# Patient Record
Sex: FEMALE | URBAN - METROPOLITAN AREA
[De-identification: names, ages, dates, MRNs, and addresses within clinical notes are randomized per-mention and may not be internally consistent; named-entity substitution may affect disease eponyms.]

---

## 2022-10-25 ENCOUNTER — OFFICE VISIT (OUTPATIENT)
Dept: URBAN - METROPOLITAN AREA CLINIC 5 | Facility: CLINIC | Age: 67
End: 2022-10-25
Payer: COMMERCIAL

## 2022-10-25 DIAGNOSIS — H02.88B MEIBOMIAN GLAND DYSFNCT LEFT EYE, UPPER AND LOWER EYELIDS: ICD-10-CM

## 2022-10-25 DIAGNOSIS — H04.123 DRY EYE SYNDROME OF BILATERAL LACRIMAL GLANDS: ICD-10-CM

## 2022-10-25 DIAGNOSIS — H25.13 AGE-RELATED NUCLEAR CATARACT, BILATERAL: Primary | ICD-10-CM

## 2022-10-25 DIAGNOSIS — H02.88A MEIBOMIAN GLAND DYSFUNCTION OF RIGHT EYE, UPPER AND LOWER EYELIDS: ICD-10-CM

## 2022-10-25 PROCEDURE — 92004 COMPRE OPH EXAM NEW PT 1/>: CPT | Performed by: OPTOMETRIST

## 2022-10-25 ASSESSMENT — INTRAOCULAR PRESSURE
OS: 14
OD: 11

## 2022-10-25 NOTE — IMPRESSION/PLAN
Impression: Meibomian gland dysfnct left eye, upper and lower eyelids: H02.88B.  Plan: see plan above

## 2022-10-25 NOTE — IMPRESSION/PLAN
Impression: Age-related nuclear cataract, bilateral: H25.13. Plan: Cataract: Observe for now without intervention. The patient was advised to contact us if any change or worsening of vision. Recheck in 2-3 months.

## 2022-10-25 NOTE — IMPRESSION/PLAN
Impression: Meibomian gland dysfunction of right eye, upper and lower eyelids: H02.88A. Plan: Meibomian gland dysfunction bilateral upper and lower lids - glands expressed in office. Use hot compresses and perform lid hygiene daily. Discussed with the patient benefits of flaxseed oil or omega 3 supplements. Discussed also using artificial tears.

## 2023-01-24 ENCOUNTER — OFFICE VISIT (OUTPATIENT)
Dept: URBAN - METROPOLITAN AREA CLINIC 6 | Facility: CLINIC | Age: 68
End: 2023-01-24
Payer: COMMERCIAL

## 2023-01-24 DIAGNOSIS — H04.123 DRY EYE SYNDROME OF BILATERAL LACRIMAL GLANDS: ICD-10-CM

## 2023-01-24 DIAGNOSIS — H25.812 COMBINED FORMS OF AGE-RELATED CATARACT, LEFT EYE: ICD-10-CM

## 2023-01-24 DIAGNOSIS — H25.13 AGE-RELATED NUCLEAR CATARACT, BILATERAL: Primary | ICD-10-CM

## 2023-01-24 DIAGNOSIS — H25.813 COMBINED FORMS OF AGE-RELATED CATARACT, BILATERAL: ICD-10-CM

## 2023-01-24 PROCEDURE — 92014 COMPRE OPH EXAM EST PT 1/>: CPT | Performed by: OPTOMETRIST

## 2023-01-24 ASSESSMENT — VISUAL ACUITY
OS: 20/30
OD: 20/30

## 2023-01-24 ASSESSMENT — INTRAOCULAR PRESSURE
OD: 15
OS: 13

## 2023-01-24 NOTE — IMPRESSION/PLAN
Impression: Dry eye syndrome of bilateral lacrimal glands: H04.123. Plan: Dry eyes - recommend improving the ocular surface especially in preparation for intraocular lens measurements - use high quality artificial tears 3-4 x daily.

## 2023-01-24 NOTE — IMPRESSION/PLAN
Impression: Age-related nuclear cataract, bilateral: H25.13. Plan: Cataracts both eyes - plan to perform cataract surgery right eye first: Discussed the risks, benefits, and alternatives of cataract surgery. Given that we almost never use retrobulbar anesthesia, there is typically no need to stop any anticoagulant medications when a patient gets cataract surgery with us. In most surgeries performed by us antibiotics are placed intracamerally during surgery. Some drops may or may not be prescribed after depending on the surgeon and appropriate post op treatment. The patient stated a full understanding and a desire to proceed with the procedure. Biometry ordered for intraocular lens selection. Advised against driving until complete. Reviewed the increased risk of retinal detachment after cataract surgery and reviewed retinal detachment and general warnings and to contact us immediately should any of those develop. 

NOTE TO COORDINATOR: candidate for all options pending A-scan measurements

## 2023-02-17 ENCOUNTER — TESTING ONLY (OUTPATIENT)
Dept: URBAN - METROPOLITAN AREA CLINIC 6 | Facility: CLINIC | Age: 68
End: 2023-02-17
Payer: COMMERCIAL

## 2023-02-17 DIAGNOSIS — H25.813 COMBINED FORMS OF AGE-RELATED CATARACT, BILATERAL: Primary | ICD-10-CM

## 2023-02-17 RX ORDER — PREDNISOLONE ACETATE 10 MG/ML
1 % SUSPENSION/ DROPS OPHTHALMIC
Qty: 10 | Refills: 0 | Status: ACTIVE
Start: 2023-02-17

## 2023-02-24 ENCOUNTER — POST-OPERATIVE VISIT (OUTPATIENT)
Dept: URBAN - METROPOLITAN AREA CLINIC 6 | Facility: CLINIC | Age: 68
End: 2023-02-24
Payer: COMMERCIAL

## 2023-02-24 DIAGNOSIS — Z48.810 ENCOUNTER FOR SURGICAL AFTERCARE FOLLOWING SURGERY ON THE SENSE ORGANS: Primary | ICD-10-CM

## 2023-02-24 PROCEDURE — 99024 POSTOP FOLLOW-UP VISIT: CPT | Performed by: OPTOMETRIST

## 2023-02-24 ASSESSMENT — INTRAOCULAR PRESSURE: OD: 14

## 2023-02-24 NOTE — IMPRESSION/PLAN
Impression: S/P Cataract Extraction by phacoemulsification with IOL placement OD - . Encounter for surgical aftercare following surgery on a sense organ  Z48.810. Plan: S/P Cataract extraction right eye with placement of moxifloxacin intracamerally -  post-op day #0 - Start Prednisolone 1% QID (shake well). Advised patient that likely will see floaters for 1-2 weeks. Advised no heavy lifting or strenuous activity for at least one week and no swimming for at least three weeks. Use eye shield all day today and then at night for one week. Patient advised to call immediately for any problems including, but not limited to, pain, redness, increase in floaters, flashing lights, changes in peripheral vision, decreased vision, and/or any other problems or concerns. Patient stated an understanding of all the instructions.

## 2023-03-03 ENCOUNTER — POST-OPERATIVE VISIT (OUTPATIENT)
Dept: URBAN - METROPOLITAN AREA CLINIC 6 | Facility: CLINIC | Age: 68
End: 2023-03-03
Payer: COMMERCIAL

## 2023-03-03 DIAGNOSIS — Z96.1 PRESENCE OF INTRAOCULAR LENS: Primary | ICD-10-CM

## 2023-03-03 PROCEDURE — 99024 POSTOP FOLLOW-UP VISIT: CPT | Performed by: OPTOMETRIST

## 2023-03-03 ASSESSMENT — INTRAOCULAR PRESSURE
OD: 10
OS: 13

## 2023-03-03 NOTE — IMPRESSION/PLAN
Impression:  Presence of intraocular lens  Z96.1. Plan: S/P Cataract extraction right/left eye with placement of moxifloxacin intracamerally - Start Prednisolone QID OS and start Prednisolone 1% Taper OD TID for 1 week, BID for 1 week, QD for 1 week then stop (shake well). Patient advised to call immediately for any problems including, but not limited to, pain, redness, increase in floaters, flashing lights, changes in peripheral vision, decreased vision, and/or any other problems or concerns.   Patient stated an understanding of all the instruction

## 2023-03-21 ENCOUNTER — POST-OPERATIVE VISIT (OUTPATIENT)
Dept: URBAN - METROPOLITAN AREA CLINIC 6 | Facility: CLINIC | Age: 68
End: 2023-03-21
Payer: COMMERCIAL

## 2023-03-21 DIAGNOSIS — Z96.1 PRESENCE OF INTRAOCULAR LENS: Primary | ICD-10-CM

## 2023-03-21 PROCEDURE — 99024 POSTOP FOLLOW-UP VISIT: CPT | Performed by: OPTOMETRIST

## 2023-03-21 ASSESSMENT — INTRAOCULAR PRESSURE
OS: 11
OD: 13

## 2023-03-21 NOTE — IMPRESSION/PLAN
Impression: S/P Cataract Extraction by phacoemulsification with IOL placement OS - 18 Days. Presence of intraocular lens  Z96.1. Plan: S/P Cataract extraction left eye with placement of moxifloxacin intracamerally - Continue along with schedule OS TID for 1 week, BID for 1 week, QD for 1 week then stop (shake well). Patient advised to call immediately for any problems including, but not limited to, pain, redness, increase in floaters, flashing lights, changes in peripheral vision, decreased vision, and/or any other problems or concerns.   Patient stated an understanding of all the instruction